# Patient Record
Sex: FEMALE
[De-identification: names, ages, dates, MRNs, and addresses within clinical notes are randomized per-mention and may not be internally consistent; named-entity substitution may affect disease eponyms.]

---

## 2017-09-11 ENCOUNTER — APPOINTMENT (OUTPATIENT)
Dept: INTERNAL MEDICINE | Facility: CLINIC | Age: 82
End: 2017-09-11
Payer: MEDICARE

## 2017-09-11 VITALS
HEIGHT: 62 IN | BODY MASS INDEX: 27.6 KG/M2 | TEMPERATURE: 97.5 F | OXYGEN SATURATION: 97 % | DIASTOLIC BLOOD PRESSURE: 70 MMHG | SYSTOLIC BLOOD PRESSURE: 124 MMHG | RESPIRATION RATE: 16 BRPM | WEIGHT: 150 LBS | HEART RATE: 56 BPM

## 2017-09-11 VITALS
DIASTOLIC BLOOD PRESSURE: 72 MMHG | SYSTOLIC BLOOD PRESSURE: 128 MMHG | RESPIRATION RATE: 18 BRPM | HEIGHT: 72 IN | WEIGHT: 267 LBS | TEMPERATURE: 97.9 F | BODY MASS INDEX: 36.16 KG/M2 | OXYGEN SATURATION: 97 % | HEART RATE: 68 BPM

## 2017-09-11 DIAGNOSIS — E78.5 HYPERLIPIDEMIA, UNSPECIFIED: ICD-10-CM

## 2017-09-11 DIAGNOSIS — F39 UNSPECIFIED MOOD [AFFECTIVE] DISORDER: ICD-10-CM

## 2017-09-11 DIAGNOSIS — M25.50 PAIN IN UNSPECIFIED JOINT: ICD-10-CM

## 2017-09-11 DIAGNOSIS — M81.0 AGE-RELATED OSTEOPOROSIS W/OUT CURRENT PATHOLOGICAL FRACTURE: ICD-10-CM

## 2017-09-11 PROCEDURE — 99214 OFFICE O/P EST MOD 30 MIN: CPT

## 2017-09-15 ENCOUNTER — OTHER (OUTPATIENT)
Age: 82
End: 2017-09-15

## 2017-09-15 LAB — GLUCOSE SERPL-MCNC: 90

## 2017-11-03 ENCOUNTER — APPOINTMENT (OUTPATIENT)
Dept: INTERNAL MEDICINE | Facility: CLINIC | Age: 82
End: 2017-11-03
Payer: MEDICARE

## 2017-11-03 VITALS
OXYGEN SATURATION: 97 % | WEIGHT: 149 LBS | BODY MASS INDEX: 27.42 KG/M2 | SYSTOLIC BLOOD PRESSURE: 106 MMHG | DIASTOLIC BLOOD PRESSURE: 60 MMHG | HEART RATE: 62 BPM | RESPIRATION RATE: 16 BRPM | TEMPERATURE: 98 F | HEIGHT: 62 IN

## 2017-11-03 DIAGNOSIS — Z00.00 ENCOUNTER FOR GENERAL ADULT MEDICAL EXAMINATION W/OUT ABNORMAL FINDINGS: ICD-10-CM

## 2017-11-03 PROCEDURE — G0439: CPT

## 2017-12-27 ENCOUNTER — RX RENEWAL (OUTPATIENT)
Age: 82
End: 2017-12-27

## 2017-12-27 DIAGNOSIS — I10 ESSENTIAL (PRIMARY) HYPERTENSION: ICD-10-CM

## 2017-12-27 RX ORDER — PROPRANOLOL HYDROCHLORIDE 80 MG/1
80 CAPSULE, EXTENDED RELEASE ORAL
Refills: 0 | Status: DISCONTINUED | COMMUNITY
Start: 2017-09-11 | End: 2017-12-27

## 2018-04-16 ENCOUNTER — RX RENEWAL (OUTPATIENT)
Age: 83
End: 2018-04-16

## 2018-05-09 ENCOUNTER — APPOINTMENT (OUTPATIENT)
Dept: DERMATOLOGY | Facility: CLINIC | Age: 83
End: 2018-05-09
Payer: MEDICARE

## 2018-05-09 DIAGNOSIS — Z80.0 FAMILY HISTORY OF MALIGNANT NEOPLASM OF DIGESTIVE ORGANS: ICD-10-CM

## 2018-05-09 DIAGNOSIS — Z85.828 PERSONAL HISTORY OF OTHER MALIGNANT NEOPLASM OF SKIN: ICD-10-CM

## 2018-05-09 DIAGNOSIS — Z78.9 OTHER SPECIFIED HEALTH STATUS: ICD-10-CM

## 2018-05-09 DIAGNOSIS — Z87.891 PERSONAL HISTORY OF NICOTINE DEPENDENCE: ICD-10-CM

## 2018-05-09 DIAGNOSIS — Z82.49 FAMILY HISTORY OF ISCHEMIC HEART DISEASE AND OTHER DISEASES OF THE CIRCULATORY SYSTEM: ICD-10-CM

## 2018-05-09 DIAGNOSIS — L82.1 OTHER SEBORRHEIC KERATOSIS: ICD-10-CM

## 2018-05-09 DIAGNOSIS — Z84.2 FAMILY HISTORY OF OTHER DISEASES OF THE GENITOURINARY SYSTEM: ICD-10-CM

## 2018-05-09 DIAGNOSIS — L57.0 ACTINIC KERATOSIS: ICD-10-CM

## 2018-05-09 DIAGNOSIS — Z86.69 PERSONAL HISTORY OF OTHER DISEASES OF THE NERVOUS SYSTEM AND SENSE ORGANS: ICD-10-CM

## 2018-05-09 DIAGNOSIS — L81.4 OTHER MELANIN HYPERPIGMENTATION: ICD-10-CM

## 2018-05-09 DIAGNOSIS — Z00.00 ENCOUNTER FOR GENERAL ADULT MEDICAL EXAMINATION W/OUT ABNORMAL FINDINGS: ICD-10-CM

## 2018-05-09 PROCEDURE — 99203 OFFICE O/P NEW LOW 30 MIN: CPT | Mod: 25

## 2018-05-09 PROCEDURE — 17003 DESTRUCT PREMALG LES 2-14: CPT

## 2018-05-09 PROCEDURE — 17000 DESTRUCT PREMALG LESION: CPT

## 2018-07-09 ENCOUNTER — MEDICATION RENEWAL (OUTPATIENT)
Age: 83
End: 2018-07-09

## 2018-07-16 ENCOUNTER — RX RENEWAL (OUTPATIENT)
Age: 83
End: 2018-07-16

## 2018-07-23 PROBLEM — Z78.9 ALCOHOL USE: Status: ACTIVE | Noted: 2017-09-11

## 2018-07-23 PROBLEM — F39 MOOD DISORDER: Status: ACTIVE | Noted: 2017-09-11

## 2018-07-23 PROBLEM — Z80.0 FAMILY HISTORY OF COLON CANCER: Status: ACTIVE | Noted: 2017-09-11

## 2018-08-24 ENCOUNTER — RX RENEWAL (OUTPATIENT)
Age: 83
End: 2018-08-24

## 2018-08-24 RX ORDER — PAROXETINE HYDROCHLORIDE 20 MG/1
20 TABLET, FILM COATED ORAL
Qty: 90 | Refills: 1 | Status: ACTIVE | COMMUNITY
Start: 2017-09-11 | End: 1900-01-01

## 2018-08-24 RX ORDER — PROPRANOLOL HYDROCHLORIDE 80 MG/1
80 CAPSULE, EXTENDED RELEASE ORAL
Qty: 90 | Refills: 1 | Status: ACTIVE | COMMUNITY
Start: 2017-12-27 | End: 1900-01-01

## 2019-03-25 ENCOUNTER — RX RENEWAL (OUTPATIENT)
Age: 84
End: 2019-03-25

## 2020-08-20 PROBLEM — Z00.00 ENCOUNTER FOR MEDICARE ANNUAL WELLNESS EXAM: Status: ACTIVE | Noted: 2017-11-03

## 2020-10-29 ENCOUNTER — APPOINTMENT (OUTPATIENT)
Dept: OTOLARYNGOLOGY | Facility: CLINIC | Age: 85
End: 2020-10-29
Payer: MEDICARE

## 2020-10-29 VITALS — TEMPERATURE: 97.3 F

## 2020-10-29 VITALS — HEIGHT: 62 IN | TEMPERATURE: 97.3 F | WEIGHT: 149 LBS | BODY MASS INDEX: 27.42 KG/M2

## 2020-10-29 DIAGNOSIS — H61.21 IMPACTED CERUMEN, RIGHT EAR: ICD-10-CM

## 2020-10-29 DIAGNOSIS — H90.3 SENSORINEURAL HEARING LOSS, BILATERAL: ICD-10-CM

## 2020-10-29 PROCEDURE — 99072 ADDL SUPL MATRL&STAF TM PHE: CPT

## 2020-10-29 PROCEDURE — 99203 OFFICE O/P NEW LOW 30 MIN: CPT | Mod: 25

## 2020-10-29 PROCEDURE — 92557 COMPREHENSIVE HEARING TEST: CPT

## 2020-10-29 PROCEDURE — G0268 REMOVAL OF IMPACTED WAX MD: CPT | Mod: RT

## 2020-10-29 PROCEDURE — 92567 TYMPANOMETRY: CPT

## 2020-10-29 NOTE — HISTORY OF PRESENT ILLNESS
[de-identified] : Patient here for hearing eval.  c/o difficulty hearing gradually for several years.  No prior ear problems.

## 2020-10-29 NOTE — ASSESSMENT
[FreeTextEntry1] : Patient c/o hearing loss.  Patient had cerumen in right ear - removed.  After cerumen removal patient noted to have bilat mod to severe snhl and HAE recmmended - follow up in 6 mos and as necessary

## 2023-08-18 ENCOUNTER — APPOINTMENT (OUTPATIENT)
Dept: ORTHOPEDIC SURGERY | Facility: AMBULATORY MEDICAL SERVICES | Age: 88
End: 2023-08-18
Payer: MEDICARE

## 2023-08-18 PROCEDURE — 27520 TREAT KNEECAP FRACTURE: CPT | Mod: LT

## 2023-08-18 PROCEDURE — 25650 CLTX ULNAR STYLOID FRACTURE: CPT | Mod: RT

## 2023-08-18 PROCEDURE — 99223 1ST HOSP IP/OBS HIGH 75: CPT | Mod: 57

## 2024-06-28 ENCOUNTER — OFFICE (OUTPATIENT)
Dept: URBAN - METROPOLITAN AREA CLINIC 88 | Facility: CLINIC | Age: 89
Setting detail: OPHTHALMOLOGY
End: 2024-06-28
Payer: MEDICARE

## 2024-06-28 DIAGNOSIS — H33.302: ICD-10-CM

## 2024-06-28 DIAGNOSIS — Z79.899: ICD-10-CM

## 2024-06-28 DIAGNOSIS — H33.312: ICD-10-CM

## 2024-06-28 DIAGNOSIS — H43.813: ICD-10-CM

## 2024-06-28 PROCEDURE — 92004 COMPRE OPH EXAM NEW PT 1/>: CPT | Mod: 25 | Performed by: OPHTHALMOLOGY

## 2024-06-28 PROCEDURE — 92083 EXTENDED VISUAL FIELD XM: CPT | Performed by: OPHTHALMOLOGY

## 2024-06-28 PROCEDURE — 92134 CPTRZ OPH DX IMG PST SGM RTA: CPT | Performed by: OPHTHALMOLOGY

## 2024-06-28 PROCEDURE — 67145 PROPH RTA DTCHMNT PC: CPT | Mod: LT | Performed by: OPHTHALMOLOGY

## 2024-06-28 ASSESSMENT — CONFRONTATIONAL VISUAL FIELD TEST (CVF)
OS_FINDINGS: FULL
OD_FINDINGS: FULL

## 2024-12-24 PROBLEM — F10.90 ALCOHOL USE: Status: ACTIVE | Noted: 2017-09-11

## 2025-01-17 ENCOUNTER — OFFICE (OUTPATIENT)
Dept: URBAN - METROPOLITAN AREA CLINIC 88 | Facility: CLINIC | Age: OVER 89
Setting detail: OPHTHALMOLOGY
End: 2025-01-17
Payer: MEDICARE

## 2025-01-17 DIAGNOSIS — H43.812: ICD-10-CM

## 2025-01-17 DIAGNOSIS — Z79.899: ICD-10-CM

## 2025-01-17 DIAGNOSIS — H33.312: ICD-10-CM

## 2025-01-17 PROCEDURE — 92250 FUNDUS PHOTOGRAPHY W/I&R: CPT | Performed by: OPHTHALMOLOGY

## 2025-01-17 PROCEDURE — 92014 COMPRE OPH EXAM EST PT 1/>: CPT | Performed by: OPHTHALMOLOGY

## 2025-01-17 PROCEDURE — 92202 OPSCPY EXTND ON/MAC DRAW: CPT | Performed by: OPHTHALMOLOGY

## 2025-01-17 ASSESSMENT — TONOMETRY
OD_IOP_MMHG: 13
OS_IOP_MMHG: 18

## 2025-01-17 ASSESSMENT — VISUAL ACUITY
OD_BCVA: 20/30
OS_BCVA: 20/40

## 2025-01-17 ASSESSMENT — CONFRONTATIONAL VISUAL FIELD TEST (CVF)
OD_FINDINGS: FULL
OS_FINDINGS: FULL

## 2025-08-27 ENCOUNTER — NON-APPOINTMENT (OUTPATIENT)
Age: 89
End: 2025-08-27